# Patient Record
(demographics unavailable — no encounter records)

---

## 2025-02-05 NOTE — HISTORY OF PRESENT ILLNESS
[FreeTextEntry8] : Pt comes in to discuss stomach irritability. Has been having issues since high school - he'd get bouts of cramping/bloating/gassiness, severe discomfort from bloating. It was less common then, thought it was related to food. In last few years feels it's more frequent. Can be incapacitated for 1-2 days at a time. It feels like gas/constipation. Sometimes it's accompanied by diarrhea. He thought it was food sensitivity (did online food sensitivity testing, [employer] (?) ) - avoided lactose, gluten, still having these bouts. Rarely associated w/ vomiting, nausea more common than vomiting. Sometimes there are spots of blood in stool, he thinks it's related to hemorrhoids. Never saw GI before. No unintentional weight loss. Normal appetite (decreased when he's having symptoms). Since symptoms first started in high school the severity of episodes is the same, the frequency has increased. Symptoms usually improve after having BM or passing flatus, but not always. Gas-X or pepto have helped a little in the past. Denies overt abd pain, more like discomfort that can be painful. Thinks he has enough fiber in the diet. Sometimes has mucus in the stool.

## 2025-02-05 NOTE — REVIEW OF SYSTEMS
[Nausea] : nausea [Constipation] : constipation [Diarrhea] : diarrhea [Vomiting] : vomiting [Negative] : Constitutional [Chest Pain] : no chest pain [Abdominal Pain] : no abdominal pain [Heartburn] : no heartburn [Joint Pain] : no joint pain [Skin Rash] : no skin rash [FreeTextEntry7] : intermittent.

## 2025-02-05 NOTE — HEALTH RISK ASSESSMENT
[0] : 2) Feeling down, depressed, or hopeless: Not at all (0) [PHQ-2 Negative - No further assessment needed] : PHQ-2 Negative - No further assessment needed [Former] : Former [0-4] : 0-4 [< 15 Years] : < 15 Years [[id number]] : 0 [de-identified] : 1 pack per week x 6-7 years

## 2025-02-05 NOTE — PHYSICAL EXAM
[Normal] : no acute distress, well nourished, well developed and well-appearing [Normal Sclera/Conjunctiva] : normal sclera/conjunctiva [Normal Outer Ear/Nose] : the outer ears and nose were normal in appearance [No Respiratory Distress] : no respiratory distress  [Soft] : abdomen soft [Non Tender] : non-tender [Non-distended] : non-distended [No Masses] : no abdominal mass palpated

## 2025-02-05 NOTE — ASSESSMENT
[FreeTextEntry1] : # Abd pain No red flag signs/symptoms Differential includes IBS w/ constipation, constipation, less likely autoimmune or neoplastic process Referred to GI, suspect he will need cscope Maintain healthy diet, adequate water intake  # Strong fam hx CAD' Father and PGF had MI @ young age Referred to cardio  # HM f/u AV labs Up to date w/ flu vaccine  f/u in 1-2 months for formal CPE

## 2025-03-03 NOTE — REASON FOR VISIT
[FreeTextEntry1] : Cardiology consultation for evaluation management of cardiac risk assessment.  Patient with family history of heart disease.

## 2025-03-03 NOTE — PHYSICAL EXAM
[Normal S1, S2] : normal S1, S2 [No Rub] : no rub [No Gallop] : no gallop [Murmur] : murmur [Normal] : alert and oriented, normal memory [de-identified] : l/Vl systolic

## 2025-03-03 NOTE — DISCUSSION/SUMMARY
[FreeTextEntry1] : 33-year-old man presenting for cardiology initial office visit for cardiac risk assessment. He has family history of heart disease with his paternal grandfather having had an MI in his 40s and his father having had coronary intervention at age 55. He is an active smoker that is trying to cut back. He has a good functional capacity and has been asymptomatic with respect to cardiac issues. On physical examination today, blood pressure is stable.  He appears to be euvolemic.  l/Vl systolic murmur. EKG is normal sinus rhythm, within normal limits.  Plan 1.  Echocardiogram to exclude structural heart disease. 2.  He plans on having blood work done in the near future, will forward results for me to review. 3.  Defer medications for now including statins. 4.  He will continue with lifestyle modifications.  Advised him that he needs to stop smoking altogether.  Advised him to continue to maintain an active aerobic lifestyle, regular moderate intensity exercise and eating a heart healthy diet. 5.  The above was reviewed with him and all of his questions have been answered to his satisfaction.  [EKG obtained to assist in diagnosis and management of assessed problem(s)] : EKG obtained to assist in diagnosis and management of assessed problem(s)

## 2025-03-03 NOTE — HISTORY OF PRESENT ILLNESS
[FreeTextEntry1] : The patient is a 33-year-old man presents as a referral from primary care provider due to concern for cardiac risk assessment. Patient is concerned regarding his own cardiac risk, given family history of heart disease. Endorses that he has a active lifestyle. He goes to the gym regularly, does weights including aerobic exercising with boxing. He denies any current complaints of chest pain or chest pressure.  No shortness of breath or dyspnea on exertion.  No palpitations.  No dizziness, lightheadedness, syncope near syncope.  No edema, orthopnea.  No PND. He has been having intermittent abdominal bloating, gas, diarrhea and irritability and plans on seeing GI in the near future.  Past history: Denies any prior cardiac history.  No history of CAD, MI, CHF, arrhythmias or heart murmurs. No hypertension, diabetes, vascular disease or hyperlipidemia. Status post septoplasty. Current medication: Vitamins and omega-3. Allergies: None. Social history: He used to smoke cigarettes but has been trying to cut back and has now been vaping and also has been trying to cut back.  Social alcohol.   with a 1-year-old.  He has a desk job as a . Family history: Paternal grandfather had an MI in his 40s.  Father had coronary stent placed at age 55.

## 2025-03-26 NOTE — HISTORY OF PRESENT ILLNESS
[FreeTextEntry1] : Patient presents for abdominal discomfort with cramping after eating. Has been present for at least 10 years per his recollection. Can last the whole day. Seems to be exacerbated by caffeine and fatty foods. Pain is crampy, generalized. Denies rectal bleeding, weight loss. Bowel movements tend to constipation, alternate with diarrhea. He denies family history of CD, UC, CRC, gastric cancer, celiac disease.

## 2025-03-26 NOTE — PHYSICAL EXAM
[Alert] : alert [Normal Voice/Communication] : normal voice/communication [Healthy Appearing] : healthy appearing [No Acute Distress] : no acute distress [Sclera] : the sclera and conjunctiva were normal [Hearing Threshold Finger Rub Not [place]] : hearing was normal [Normal Lips/Gums] : the lips and gums were normal [Oropharynx] : the oropharynx was normal [Normal Appearance] : the appearance of the neck was normal [No Neck Mass] : no neck mass was observed [No Respiratory Distress] : no respiratory distress [No Acc Muscle Use] : no accessory muscle use [Respiration, Rhythm And Depth] : normal respiratory rhythm and effort [Auscultation Breath Sounds / Voice Sounds] : lungs were clear to auscultation bilaterally [Heart Rate And Rhythm] : heart rate was normal and rhythm regular [Normal S1, S2] : normal S1 and S2 [Murmurs] : no murmurs [Bowel Sounds] : normal bowel sounds [Abdomen Tenderness] : non-tender [No Masses] : no abdominal mass palpated [Abdomen Soft] : soft [] : no hepatosplenomegaly [No CVA Tenderness] : no CVA  tenderness [Involuntary Movements] : no involuntary movements were seen [Normal Color / Pigmentation] : normal skin color and pigmentation [No Focal Deficits] : no focal deficits [Oriented To Time, Place, And Person] : oriented to person, place, and time

## 2025-03-26 NOTE — PHYSICAL EXAM
[Alert] : alert [Normal Voice/Communication] : normal voice/communication [Healthy Appearing] : healthy appearing [No Acute Distress] : no acute distress [Sclera] : the sclera and conjunctiva were normal [Hearing Threshold Finger Rub Not [place]] : hearing was normal [Normal Lips/Gums] : the lips and gums were normal [Oropharynx] : the oropharynx was normal [Normal Appearance] : the appearance of the neck was normal [No Neck Mass] : no neck mass was observed [No Respiratory Distress] : no respiratory distress [No Acc Muscle Use] : no accessory muscle use [Respiration, Rhythm And Depth] : normal respiratory rhythm and effort [Auscultation Breath Sounds / Voice Sounds] : lungs were clear to auscultation bilaterally [Heart Rate And Rhythm] : heart rate was normal and rhythm regular [Normal S1, S2] : normal S1 and S2 [Murmurs] : no murmurs [Bowel Sounds] : normal bowel sounds [Abdomen Tenderness] : non-tender [No Masses] : no abdominal mass palpated [Abdomen Soft] : soft [] : no hepatosplenomegaly [No CVA Tenderness] : no CVA  tenderness [Involuntary Movements] : no involuntary movements were seen [Normal Color / Pigmentation] : normal skin color and pigmentation [No Focal Deficits] : no focal deficits [Oriented To Time, Place, And Person] : oriented to person, place, and time

## 2025-03-26 NOTE — ASSESSMENT
[FreeTextEntry1] : Suspect IBS. Explained the diagnosis to patient. Reassured him that IBS is not a deadly disorder but that it can be difficult to eliminate all symptoms of this disorder. Advised dietary modification with smaller meals with less caloric density. Also advised a trial of FDGard.  Will get EGD and colonoscopy. Explained the risks, benefits, indications, alternatives. The risks include but are not limited to bleeding, infection, perforation, reaction to anesthesia, or missed lesions. The patient verbalized understanding and wishes to proceed. Split-dose Suprep.  Follow up after testing. He agrees.

## 2025-03-26 NOTE — REVIEW OF SYSTEMS
[Negative] : Heme/Lymph [As Noted in HPI] : as noted in HPI [Abdominal Pain] : abdominal pain [Constipation] : constipation [Diarrhea] : diarrhea [Bloating (gassiness)] : bloating [Melena (black stool)] : no melena [Bleeding] : no bleeding

## 2025-04-23 NOTE — HISTORY OF PRESENT ILLNESS
[FreeTextEntry1] : CPE [de-identified] : Pt comes in for CPE.  Obesity  HLD, at risk for heart disease, transaminitis

## 2025-05-07 NOTE — HISTORY OF PRESENT ILLNESS
[FreeTextEntry1] : CPE [de-identified] : Pt comes in for CPE.  Obesity, preDM: significantly cut back on sugar in diet, since seeing his labs he has been more serious about diet/exercise changes to lose weight.   Transaminitis: Doesn't drink alcohol frequently. Doesn't take a lot of tylenol usually.

## 2025-05-07 NOTE — HEALTH RISK ASSESSMENT
[2 - 4 times a month (2 pts)] : 2-4 times a month (2 points) [1 or 2 (0 pts)] : 1 or 2 (0 points) [Never (0 pts)] : Never (0 points) [Yes] : In the past 12 months have you used drugs other than those required for medical reasons? Yes [0] : 2) Feeling down, depressed, or hopeless: Not at all (0) [PHQ-2 Negative - No further assessment needed] : PHQ-2 Negative - No further assessment needed [Never] : Never [HIV test declined] : HIV test declined [Hepatitis C test declined] : Hepatitis C test declined [Audit-CScore] : 2 [de-identified] : smokes marijuana infrequently [de-identified] : weightlifting/strength training, trying to do cardio 30 min daily [de-identified] : cut out fast food, mornings are eggs w/ fruit, black coffee, lunch is lean protein like fish or chicken and vegetable, dinner is similar (lean protein, vegetable, grains) [EER6Syeqm] : 0

## 2025-05-07 NOTE — HEALTH RISK ASSESSMENT
[2 - 4 times a month (2 pts)] : 2-4 times a month (2 points) [1 or 2 (0 pts)] : 1 or 2 (0 points) [Never (0 pts)] : Never (0 points) [Yes] : In the past 12 months have you used drugs other than those required for medical reasons? Yes [0] : 2) Feeling down, depressed, or hopeless: Not at all (0) [PHQ-2 Negative - No further assessment needed] : PHQ-2 Negative - No further assessment needed [Never] : Never [HIV test declined] : HIV test declined [Hepatitis C test declined] : Hepatitis C test declined [Audit-CScore] : 2 [de-identified] : smokes marijuana infrequently [de-identified] : weightlifting/strength training, trying to do cardio 30 min daily [de-identified] : cut out fast food, mornings are eggs w/ fruit, black coffee, lunch is lean protein like fish or chicken and vegetable, dinner is similar (lean protein, vegetable, grains) [BUT5Hzpxz] : 0

## 2025-05-07 NOTE — ASSESSMENT
[Vaccines Reviewed] : Immunizations reviewed today. Please see immunization details in the vaccine log within the immunization flowsheet.  [FreeTextEntry1] : # HM Reviewed AV labs Not due for any cancer screening.  #Obesity, prediabetes, hyperlipidemia Patient has been working on diet and exercise for weight loss.  Encouraged him to continue, did also want him to take some things in moderation because I do not want him to fall off the wagon in a few months. Continue to limit red meat, greasy food, fatty food, processed foods, increase raw fruits and vegetables, lean protein. Duration of discussion 15 min  #At risk for heart disease Patient went to see cardio, had workup done, so far everything normal. Strong family history  #Transaminitis No evidence of excessive alcohol use In setting of hyperlipidemia and obesity will send for ultrasound abdomen to rule out fatty liver  # Hyperkalemia Repeat K level  f/u in 3-4 months for weight check   Visit conducted as part of ongoing, longitudinal medical care for patient's medical and other issues.

## 2025-05-07 NOTE — PHYSICAL EXAM
[No Lymphadenopathy] : no lymphadenopathy [Supple] : supple [Normal] : normal rate, regular rhythm, normal S1 and S2 and no murmur heard [No Edema] : there was no peripheral edema [No Extremity Clubbing/Cyanosis] : no extremity clubbing/cyanosis [Soft] : abdomen soft [Non Tender] : non-tender [Non-distended] : non-distended [No Masses] : no abdominal mass palpated [Normal Supraclavicular Nodes] : no supraclavicular lymphadenopathy [Normal Anterior Cervical Nodes] : no anterior cervical lymphadenopathy [Normal Gait] : normal gait [Speech Grossly Normal] : speech grossly normal [Normal Affect] : the affect was normal [Alert and Oriented x3] : oriented to person, place, and time [Normal Mood] : the mood was normal [Normal Insight/Judgement] : insight and judgment were intact [de-identified] : Could not visualize R TM due to cerumen impaction, L TM normal

## 2025-05-07 NOTE — PHYSICAL EXAM
[No Lymphadenopathy] : no lymphadenopathy [Supple] : supple [Normal] : normal rate, regular rhythm, normal S1 and S2 and no murmur heard [No Edema] : there was no peripheral edema [No Extremity Clubbing/Cyanosis] : no extremity clubbing/cyanosis [Soft] : abdomen soft [Non Tender] : non-tender [Non-distended] : non-distended [No Masses] : no abdominal mass palpated [Normal Supraclavicular Nodes] : no supraclavicular lymphadenopathy [Normal Anterior Cervical Nodes] : no anterior cervical lymphadenopathy [Normal Gait] : normal gait [Speech Grossly Normal] : speech grossly normal [Normal Affect] : the affect was normal [Alert and Oriented x3] : oriented to person, place, and time [Normal Mood] : the mood was normal [Normal Insight/Judgement] : insight and judgment were intact [de-identified] : Could not visualize R TM due to cerumen impaction, L TM normal

## 2025-05-07 NOTE — HISTORY OF PRESENT ILLNESS
[FreeTextEntry1] : CPE [de-identified] : Pt comes in for CPE.  Obesity, preDM: significantly cut back on sugar in diet, since seeing his labs he has been more serious about diet/exercise changes to lose weight.   Transaminitis: Doesn't drink alcohol frequently. Doesn't take a lot of tylenol usually.